# Patient Record
Sex: FEMALE | Race: BLACK OR AFRICAN AMERICAN | Employment: FULL TIME | URBAN - METROPOLITAN AREA
[De-identification: names, ages, dates, MRNs, and addresses within clinical notes are randomized per-mention and may not be internally consistent; named-entity substitution may affect disease eponyms.]

---

## 2021-10-26 ENCOUNTER — APPOINTMENT (OUTPATIENT)
Dept: GENERAL RADIOLOGY | Age: 41
End: 2021-10-26
Attending: PHYSICIAN ASSISTANT
Payer: OTHER GOVERNMENT

## 2021-10-26 ENCOUNTER — HOSPITAL ENCOUNTER (EMERGENCY)
Age: 41
Discharge: HOME | End: 2021-10-26
Attending: EMERGENCY MEDICINE
Payer: OTHER GOVERNMENT

## 2021-10-26 VITALS
OXYGEN SATURATION: 96 % | DIASTOLIC BLOOD PRESSURE: 65 MMHG | SYSTOLIC BLOOD PRESSURE: 101 MMHG | HEART RATE: 105 BPM | TEMPERATURE: 99 F | HEIGHT: 66 IN | RESPIRATION RATE: 20 BRPM | BODY MASS INDEX: 36.96 KG/M2 | WEIGHT: 230 LBS

## 2021-10-26 DIAGNOSIS — U07.1 COVID-19 VIRUS INFECTION: Primary | ICD-10-CM

## 2021-10-26 PROCEDURE — M0243 CASIRIVI AND IMDEVI INFUSION: HCPCS

## 2021-10-26 PROCEDURE — 71045 X-RAY EXAM CHEST 1 VIEW: CPT

## 2021-10-26 PROCEDURE — 99283 EMERGENCY DEPT VISIT LOW MDM: CPT

## 2021-10-26 PROCEDURE — 74011000258 HC RX REV CODE- 258: Performed by: EMERGENCY MEDICINE

## 2021-10-26 PROCEDURE — 74011000636 HC RX REV CODE- 636: Performed by: EMERGENCY MEDICINE

## 2021-10-26 RX ADMIN — CASIRIVIMAB AND IMDEVIMAB: 600; 600 INJECTION, SOLUTION, CONCENTRATE INTRAVENOUS at 14:57

## 2021-10-26 NOTE — ED PROVIDER NOTES
[address] [place] [address]     Gagan Macias is a 35 y.o. female seen on 10/26/2021 in the [address] [place], [place] [address]T in room 612 [facility]. Chief Complaint   Patient presents with    Positive For Covid-19     HPI: 25-year-old -American female presented to the emergency department with known COVID-19 infection. Patient arrived via EMS secondary to shortness of breath. Patient had a positive Covid test on 10/18/2021. Patient with symptoms 2 to 3 days prior to that. Patient states that she has been having shortness of breath and body aches. She is not vaccinated for COVID-19. She denies any nausea, vomiting, diarrhea. She has had increased fatigue, and intermittent fevers and body aches. She has no other complaints this time. Historian: Patient    REVIEW OF SYSTEMS     Review of Systems   Constitutional: Positive for fatigue and fever. Respiratory: Positive for choking and shortness of breath. Cardiovascular: Negative. Gastrointestinal: Negative. Genitourinary: Negative. Musculoskeletal: Positive for myalgias. Skin: Negative. Neurological: Negative. Psychiatric/Behavioral: Negative. All other systems reviewed and are negative. PAST MEDICAL HISTORY     No past medical history on file. No past surgical history on file. Social History     Socioeconomic History    Marital status: SINGLE     Spouse name: Not on file    Number of children: Not on file    Years of education: Not on file    Highest education level: Not on file     Social Determinants of Health     Financial Resource Strain:     Difficulty of Paying Living Expenses:    Food Insecurity:     Worried About Running Out of Food in the Last Year:     [address] in the Last Year:    Transportation Needs:     Lack of Transportation (Medical):      Lack of Transportation (Non-Medical):    Physical Activity:     Days of Exercise per Week:     Minutes of Exercise per Session:    Stress:     Feeling of Stress :    Social Connections:     Frequency of Communication with Friends and Family:     Frequency of Social Gatherings with Friends and Family:     Attends Congregation Services:     Active Member of Clubs or Organizations:     Attends Club or Organization Meetings:     Marital Status:      None     No Known Allergies     PHYSICAL EXAM       Vitals:    10/26/21 1347   BP: 121/75   Pulse: (!) 105   Resp: 20   Temp: 99 °F (37.2 °C)   SpO2: 95%    Vital signs were reviewed. Physical Exam  Vitals and nursing note reviewed. Constitutional:       Appearance: She is ill-appearing (Appears to not feel well). HENT:      Head: Normocephalic and atraumatic. Mouth/Throat:      Mouth: Mucous membranes are moist.   Eyes:      Extraocular Movements: Extraocular movements intact. Cardiovascular:      Rate and Rhythm: Normal rate and regular rhythm. Pulses: Normal pulses. Heart sounds: Normal heart sounds. Pulmonary:      Effort: Pulmonary effort is normal.      Breath sounds: Normal breath sounds. Abdominal:      Palpations: Abdomen is soft. Tenderness: There is no abdominal tenderness. Musculoskeletal:         General: Normal range of motion. Cervical back: Normal range of motion. Skin:     General: Skin is warm and dry. Neurological:      General: No focal deficit present. Mental Status: She is alert and oriented to person, place, and time. Psychiatric:         Mood and Affect: Mood normal.         Behavior: Behavior normal.         Thought Content:  Thought content normal.         Judgment: Judgment normal.          MEDICAL DECISION MAKING     ED Course:    Orders Placed This Encounter    XR CHEST PORT    AMBULATE PATIENT    DME Order for Pulse Oximeter Device As OP    DISCONTD: casirivimab-imdevimab (REGEN-COV) 1,200 mg in 0.9% sodium chloride 110 mL IVPB    casirivimab-imdevimab (REGEN-COV) 1,200 mg in 0.9% sodium chloride 110 mL IVPB     No results found for this or any previous visit (from the past 8 hour(s)). XR CHEST PORT    Result Date: 10/26/2021  PA CHEST ONE VIEW HISTORY:  ER:-COVID + on 10/18/21 with dry cough, sob and weakness COMPARISON: None FINDINGS: The heart size is normal. The lung volumes are diminished. There is minimal atelectasis or infiltrate in the left lung base. Pulmonary vascularity is within normal limits. Low lung volumes with minimal atelectasis or infiltrate in the left lung base. MDM  Number of Diagnoses or Management Options  Diagnosis management comments: 18-year-old female with [id number] presented emergency department with continued shortness of breath. Patient onset of symptoms is 10 to 11 days prior to arrival here. Patient has oxygen saturations are 95% or better. Patient will be given Regeneron monoclonal antibody infusion. Patient's chest x-ray was read as possible left lower lobe infiltrate however this appears to be her rib shadow and I believe this is an over read. Patient given pulse oximeter for home. She will return the emergency department for oxygen levels less than 90%. Amount and/or Complexity of Data Reviewed  Clinical lab tests: reviewed  Independent visualization of images, tracings, or specimens: yes    Patient Progress  Patient progress: stable        Disposition:  Discharged  Diagnosis:     ICD-10-CM ICD-9-CM   1. COVID-19 virus infection  U07.1 079.89     ____________________________________________________________________  A portion of this note was generated using voice recognition dictation software. While the note has been reviewed for accuracy, please note certain words and phrases may not be transcribed as intended and some grammatical and/or typographical errors may be present.

## 2021-10-26 NOTE — DISCHARGE INSTRUCTIONS
Return to the emergency department for oxygen levels less than 90% as discussed or for any other concerns.

## 2021-10-26 NOTE — ED NOTES
I have reviewed discharge instructions with the patient. The patient verbalized understanding. Patient left ED via Discharge Method: ambulatory to Home with self. Opportunity for questions and clarification provided. Patient given 0 scripts. To continue your aftercare when you leave the hospital, you may receive an automated call from our care team to check in on how you are doing. This is a free service and part of our promise to provide the best care and service to meet your aftercare needs.  If you have questions, or wish to unsubscribe from this service please call [phone]. Thank you for Choosing our [employer] Emergency Department.

## 2021-10-26 NOTE — ED NOTES
SOB and cough, positive Covid test on 10/18/21. Had symptoms 4 days prior to that. No wheezing, O2 95% on RA. Patient evaluated initially in triage. Rapid Medical Evaluation was conducted and necessary orders have been placed. I have performed a medical screening exam.  Care will now be transferred to the attending physician in the emergency department.   [facility] 2:65 PM

## 2023-06-08 ENCOUNTER — HOSPITAL ENCOUNTER (EMERGENCY)
Age: 43
Discharge: HOME | End: 2023-06-08
Attending: EMERGENCY MEDICINE
Payer: COMMERCIAL

## 2023-06-08 VITALS
SYSTOLIC BLOOD PRESSURE: 125 MMHG | DIASTOLIC BLOOD PRESSURE: 86 MMHG | TEMPERATURE: 97.9 F | HEART RATE: 90 BPM | BODY MASS INDEX: 37.12 KG/M2 | HEIGHT: 66 IN | RESPIRATION RATE: 16 BRPM | OXYGEN SATURATION: 97 %

## 2023-06-08 DIAGNOSIS — R74.8 ELEVATED LIVER ENZYMES: Primary | ICD-10-CM

## 2023-06-08 LAB
ALBUMIN SERPL-MCNC: 3.1 G/DL (ref 3.5–5)
ALBUMIN/GLOB SERPL: 0.6 (ref 0.4–1.6)
ALP SERPL-CCNC: 243 U/L (ref 50–130)
ALT SERPL-CCNC: 199 U/L (ref 12–65)
ANION GAP SERPL CALC-SCNC: 7 MMOL/L (ref 2–11)
AST SERPL-CCNC: 205 U/L (ref 15–37)
BASOPHILS # BLD: 0 K/UL (ref 0–0.2)
BASOPHILS NFR BLD: 1 % (ref 0–2)
BILIRUB SERPL-MCNC: 0.4 MG/DL (ref 0.2–1.1)
BUN SERPL-MCNC: 18 MG/DL (ref 6–23)
CALCIUM SERPL-MCNC: 8.8 MG/DL (ref 8.3–10.4)
CHLORIDE SERPL-SCNC: 111 MMOL/L (ref 101–110)
CO2 SERPL-SCNC: 28 MMOL/L (ref 21–32)
CREAT SERPL-MCNC: 0.98 MG/DL (ref 0.6–1)
DIFFERENTIAL METHOD BLD: NORMAL
EOSINOPHIL # BLD: 0.2 K/UL (ref 0–0.8)
EOSINOPHIL NFR BLD: 2 % (ref 0.5–7.8)
ERYTHROCYTE [DISTWIDTH] IN BLOOD BY AUTOMATED COUNT: 13.3 % (ref 11.9–14.6)
GLOBULIN SER CALC-MCNC: 5.2 G/DL (ref 2.8–4.5)
GLUCOSE SERPL-MCNC: 111 MG/DL (ref 65–100)
HCG SERPL QL: NEGATIVE
HCT VFR BLD AUTO: 41.2 % (ref 35.8–46.3)
HGB BLD-MCNC: 13.5 G/DL (ref 11.7–15.4)
IMM GRANULOCYTES # BLD AUTO: 0 K/UL (ref 0–0.5)
IMM GRANULOCYTES NFR BLD AUTO: 0 % (ref 0–5)
LYMPHOCYTES # BLD: 2.8 K/UL (ref 0.5–4.6)
LYMPHOCYTES NFR BLD: 41 % (ref 13–44)
MCH RBC QN AUTO: 28.5 PG (ref 26.1–32.9)
MCHC RBC AUTO-ENTMCNC: 32.8 G/DL (ref 31.4–35)
MCV RBC AUTO: 87.1 FL (ref 82–102)
MONOCYTES # BLD: 0.5 K/UL (ref 0.1–1.3)
MONOCYTES NFR BLD: 8 % (ref 4–12)
NEUTS SEG # BLD: 3.3 K/UL (ref 1.7–8.2)
NEUTS SEG NFR BLD: 48 % (ref 43–78)
NRBC # BLD: 0 K/UL (ref 0–0.2)
PLATELET # BLD AUTO: 258 K/UL (ref 150–450)
PMV BLD AUTO: 11.6 FL (ref 9.4–12.3)
POTASSIUM SERPL-SCNC: 4.1 MMOL/L (ref 3.5–5.1)
PROT SERPL-MCNC: 8.3 G/DL (ref 6.3–8.2)
RBC # BLD AUTO: 4.73 M/UL (ref 4.05–5.2)
SODIUM SERPL-SCNC: 146 MMOL/L (ref 133–143)
WBC # BLD AUTO: 6.8 K/UL (ref 4.3–11.1)

## 2023-06-08 PROCEDURE — 2580000003 HC RX 258: Performed by: EMERGENCY MEDICINE

## 2023-06-08 PROCEDURE — 6360000002 HC RX W HCPCS: Performed by: EMERGENCY MEDICINE

## 2023-06-08 PROCEDURE — 85025 COMPLETE CBC W/AUTO DIFF WBC: CPT

## 2023-06-08 PROCEDURE — 99284 EMERGENCY DEPT VISIT MOD MDM: CPT

## 2023-06-08 PROCEDURE — 96374 THER/PROPH/DIAG INJ IV PUSH: CPT

## 2023-06-08 PROCEDURE — 80053 COMPREHEN METABOLIC PANEL: CPT

## 2023-06-08 PROCEDURE — 84703 CHORIONIC GONADOTROPIN ASSAY: CPT

## 2023-06-08 PROCEDURE — 96361 HYDRATE IV INFUSION ADD-ON: CPT

## 2023-06-08 RX ORDER — SODIUM CHLORIDE, SODIUM LACTATE, POTASSIUM CHLORIDE, AND CALCIUM CHLORIDE .6; .31; .03; .02 G/100ML; G/100ML; G/100ML; G/100ML
1000 INJECTION, SOLUTION INTRAVENOUS ONCE
Status: COMPLETED | OUTPATIENT
Start: 2023-06-08 | End: 2023-06-08

## 2023-06-08 RX ORDER — KETOROLAC TROMETHAMINE 15 MG/ML
15 INJECTION, SOLUTION INTRAMUSCULAR; INTRAVENOUS ONCE
Status: COMPLETED | OUTPATIENT
Start: 2023-06-08 | End: 2023-06-08

## 2023-06-08 RX ADMIN — KETOROLAC TROMETHAMINE 15 MG: 15 INJECTION, SOLUTION INTRAMUSCULAR; INTRAVENOUS at 07:46

## 2023-06-08 RX ADMIN — SODIUM CHLORIDE, POTASSIUM CHLORIDE, SODIUM LACTATE AND CALCIUM CHLORIDE 1000 ML: 600; 310; 30; 20 INJECTION, SOLUTION INTRAVENOUS at 07:40

## 2023-06-08 ASSESSMENT — ENCOUNTER SYMPTOMS
VOMITING: 0
NAUSEA: 0
EYE PAIN: 0
COUGH: 0
BACK PAIN: 0
VOICE CHANGE: 0
TROUBLE SWALLOWING: 0
ABDOMINAL PAIN: 0
SHORTNESS OF BREATH: 0
FACIAL SWELLING: 0
SORE THROAT: 0
CHEST TIGHTNESS: 0

## 2023-06-08 ASSESSMENT — PAIN SCALES - GENERAL
PAINLEVEL_OUTOF10: 6
PAINLEVEL_OUTOF10: 6

## 2023-06-08 ASSESSMENT — LIFESTYLE VARIABLES
HOW MANY STANDARD DRINKS CONTAINING ALCOHOL DO YOU HAVE ON A TYPICAL DAY: PATIENT DOES NOT DRINK
HOW OFTEN DO YOU HAVE A DRINK CONTAINING ALCOHOL: NEVER

## 2023-06-08 ASSESSMENT — PAIN DESCRIPTION - LOCATION: LOCATION: HEAD

## 2023-06-08 ASSESSMENT — PAIN DESCRIPTION - DESCRIPTORS: DESCRIPTORS: THROBBING

## 2023-06-08 ASSESSMENT — PAIN - FUNCTIONAL ASSESSMENT: PAIN_FUNCTIONAL_ASSESSMENT: 0-10

## 2023-06-08 NOTE — ED PROVIDER NOTES
Emergency Department Provider Note       PCP: No primary care provider on file. Age: 37 y.o. Sex: female     DISPOSITION Decision To Discharge 06/08/2023 09:18:35 AM       ICD-10-CM    1. Elevated liver enzymes  R74.8           Medical Decision Making     47-year-old female presents to the emergency department via private vehicle with chief complaint of headache blurry vision and polydipsia. Patient reports having a gradual onset of headache over the past 4 days she has some associated bilateral blurry vision she denies any motor or sensation deficits she reports no prior medical history is not any current medicines she is not followed by PCP she denies any chest pain shortness of breath abdominal pain or urinary symptoms. She is concerned for potential diabetes with no prior history of diabetes vital signs are reviewed. Patient was given fluids and Toradol for the headache. Basic lab work here is obtained to check the patient's sugars. Vital signs are reviewed CBC is unremarkable. CMP showed elevated liver enzymes with normal bilirubin. hCG is negative. Patient states that she has been told in the past she has had elevated liver enzymes she is not complaining of any abdominal pain. This point patient was given follow-up with her primary care doctor for continued evaluation and work-up of her elevated liver enzymes I stressed importance of this and she was agreeable with this. Patient has no emergent findings at this time. Patient's heart rate has normalized into the 90s with the fluids and her headache is much better. Patient stable discharge abdominal examination        Complexity of Problems Addressed:  1 or more acute illnesses that pose a threat to life or bodily function. Data Reviewed and Analyzed:  Category 1:   I independently ordered and reviewed each unique test.  I reviewed external records: ED visit note from an outside group.   I reviewed external records: provider visit note from

## 2023-06-08 NOTE — DISCHARGE INSTRUCTIONS
Please make sure you call this number to establish a primary care doctor so they can continue to evaluate your elevated liver enzymes. We would love to help you get a primary care doctor for follow-up after your emergency department visit. Please call [phone] between 7AM - 6PM Monday to Friday. A care navigator will be able to assist you with setting up a doctor close to your home.

## 2023-06-08 NOTE — ED NOTES
I have reviewed discharge instructions with the patient. The patient verbalized understanding. Patient left ED via Discharge Method: ambulatory to Home with self. Opportunity for questions and clarification provided. Patient given 0 scripts. To continue your aftercare when you leave the hospital, you may receive an automated call from our care team to check in on how you are doing. This is a free service and part of our promise to provide the best care and service to meet your aftercare needs.  If you have questions, or wish to unsubscribe from this service please call [phone]. Thank you for Choosing our [facility] Emergency Department.         [facility]  06/08/23 2825

## 2024-05-09 ENCOUNTER — HOSPITAL ENCOUNTER (EMERGENCY)
Age: 44
Discharge: HOME | End: 2024-05-09
Attending: EMERGENCY MEDICINE
Payer: COMMERCIAL

## 2024-05-09 VITALS
RESPIRATION RATE: 16 BRPM | WEIGHT: 240 LBS | DIASTOLIC BLOOD PRESSURE: 107 MMHG | SYSTOLIC BLOOD PRESSURE: 155 MMHG | HEIGHT: 66 IN | HEART RATE: 99 BPM | TEMPERATURE: 98 F | OXYGEN SATURATION: 96 % | BODY MASS INDEX: 38.57 KG/M2

## 2024-05-09 DIAGNOSIS — Z77.21 EXPOSURE TO BODY FLUID DUE TO ACCIDENTAL HYPODERMIC NEEDLESTICK INJURY: Primary | ICD-10-CM
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
## 2024-05-09 LAB
HAV IGM SER QL: NONREACTIVE
HBV CORE IGM SER QL: NONREACTIVE
HBV SURFACE AG SER QL: NONREACTIVE
HCV AB SER QL: NONREACTIVE
HIV 1+2 AB+HIV1 P24 AG SERPL QL IA: NONREACTIVE
HIV 1/2 RESULT COMMENT: NORMAL

## 2024-05-09 PROCEDURE — 80074 ACUTE HEPATITIS PANEL: CPT

## 2024-05-09 PROCEDURE — 99282 EMERGENCY DEPT VISIT SF MDM: CPT

## 2024-05-09 PROCEDURE — 87389 HIV-1 AG W/HIV-1&-2 AB AG IA: CPT

## 2024-05-09 ASSESSMENT — PAIN - FUNCTIONAL ASSESSMENT: PAIN_FUNCTIONAL_ASSESSMENT: NONE - DENIES PAIN

## 2024-05-09 NOTE — DISCHARGE INSTRUCTIONS
We recommend testing for hepatitis C and HIV in roughly 6 weeks to 3 months, this can be done through primary care provider or potentially through occupational health with work.  Please speak with your supervisor further regarding possible testing options through work.  You may also call the health department and inquire whether or not HIV or hepatitis can be performed.  Your results will come to your MyChart and ideally someone should call you if your test results from today's testing is positive.  We recommend follow-up with your primary care provider in the next 2-4 weeks

## 2024-05-09 NOTE — ED PROVIDER NOTES
Emergency Department Provider Note                   PCP:                No primary care provider on file.               Age: 44 y.o.      Sex: female   Final diagnosis/impression:  1. Exposure to body fluid due to accidental hypodermic needlestick injury       Disposition: Discharged    MDM/Clinical Course:  Patient seen by myself at the [facility] emergency department. Patient had signs symptoms and clinical history most consistent with needlestick exposure with insulin needle.  Well-appearing.  HIV and hepatitis panel to be performed recommended basic wound care measures for needlestick patient be notified of testing is positive, also instructed patient that results should be available in [employer].  Did recommend retesting sometime between 6 weeks for hepatitis and around 3 months for HIV given limitations of testing related to needlestick exposure this morning.  Recommended monitoring for symptoms, return as needed.  Patient/family given instructions to return as needed for any questions, concerns or worsening symptoms, particularly those as outlined in the disposition section / discharge section of patient discharge paperwork. Patient/family verbalizes understanding and agreement with ED course/plan in shared medical decision making. Questions answered.      Complexity of Problems Addressed:  1 or more acute illnesses that pose a threat to life or bodily function.     Data Reviewed and Analyzed:    Category 1:   I ordered each unique test.  I reviewed the results of each unique test.    Category 2:     Category 3: Discussion of management or test interpretation.  See MDM / clinical course section above for details    Risk of Complications and/or Morbidity of Patient Management:  Shared medical decision making was utilized in creating the patients health plan today.    Orders/Meds:    Orders Placed This Encounter   Procedures    Hepatitis Panel, Acute    HIV 1/2 Ag/Ab, 4TH Generation,W Rflx Confirm

## 2024-07-17 ENCOUNTER — HOSPITAL ENCOUNTER (EMERGENCY)
Age: 44
Discharge: HOME | End: 2024-07-17
Attending: EMERGENCY MEDICINE
Payer: COMMERCIAL

## 2024-07-17 VITALS
HEART RATE: 81 BPM | WEIGHT: 240 LBS | TEMPERATURE: 98.2 F | RESPIRATION RATE: 16 BRPM | BODY MASS INDEX: 38.57 KG/M2 | DIASTOLIC BLOOD PRESSURE: 83 MMHG | HEIGHT: 66 IN | SYSTOLIC BLOOD PRESSURE: 155 MMHG | OXYGEN SATURATION: 97 %

## 2024-07-17 DIAGNOSIS — M54.32 SCIATICA OF LEFT SIDE: Primary | ICD-10-CM

## 2024-07-17 PROCEDURE — 99284 EMERGENCY DEPT VISIT MOD MDM: CPT

## 2024-07-17 PROCEDURE — 6370000000 HC RX 637 (ALT 250 FOR IP): Performed by: EMERGENCY MEDICINE

## 2024-07-17 PROCEDURE — 96372 THER/PROPH/DIAG INJ SC/IM: CPT

## 2024-07-17 PROCEDURE — 6360000002 HC RX W HCPCS: Performed by: EMERGENCY MEDICINE

## 2024-07-17 RX ORDER — HYDROCODONE BITARTRATE AND ACETAMINOPHEN 5; 325 MG/1; MG/1
1 TABLET ORAL
Status: COMPLETED | OUTPATIENT
Start: 2024-07-17 | End: 2024-07-17

## 2024-07-17 RX ORDER — DEXAMETHASONE SODIUM PHOSPHATE 10 MG/ML
10 INJECTION INTRAMUSCULAR; INTRAVENOUS ONCE
Status: COMPLETED | OUTPATIENT
Start: 2024-07-17 | End: 2024-07-17

## 2024-07-17 RX ORDER — CYCLOBENZAPRINE HCL 10 MG
10 TABLET ORAL 3 TIMES DAILY PRN
Qty: 21 TABLET | Refills: 0 | Status: SHIPPED | OUTPATIENT
Start: 2024-07-17 | End: 2024-07-27

## 2024-07-17 RX ORDER — LIDOCAINE 50 MG/G
1 PATCH TOPICAL DAILY
Qty: 10 PATCH | Refills: 0 | Status: SHIPPED | OUTPATIENT
Start: 2024-07-17 | End: 2024-07-27

## 2024-07-17 RX ORDER — CYCLOBENZAPRINE HCL 10 MG
10 TABLET ORAL
Status: COMPLETED | OUTPATIENT
Start: 2024-07-17 | End: 2024-07-17

## 2024-07-17 RX ORDER — LIDOCAINE 4 G/G
1 PATCH TOPICAL
Status: DISCONTINUED | OUTPATIENT
Start: 2024-07-17 | End: 2024-07-17 | Stop reason: HOSPADM

## 2024-07-17 RX ORDER — NAPROXEN 500 MG/1
500 TABLET ORAL 2 TIMES DAILY PRN
Qty: 60 TABLET | Refills: 0 | Status: SHIPPED | OUTPATIENT
Start: 2024-07-17

## 2024-07-17 RX ORDER — KETOROLAC TROMETHAMINE 30 MG/ML
30 INJECTION, SOLUTION INTRAMUSCULAR; INTRAVENOUS ONCE
Status: COMPLETED | OUTPATIENT
Start: 2024-07-17 | End: 2024-07-17

## 2024-07-17 RX ADMIN — KETOROLAC TROMETHAMINE 30 MG: 30 INJECTION, SOLUTION INTRAMUSCULAR at 05:25

## 2024-07-17 RX ADMIN — DEXAMETHASONE SODIUM PHOSPHATE 10 MG: 10 INJECTION INTRAMUSCULAR; INTRAVENOUS at 05:25

## 2024-07-17 RX ADMIN — HYDROCODONE BITARTRATE AND ACETAMINOPHEN 1 TABLET: 5; 325 TABLET ORAL at 05:25

## 2024-07-17 RX ADMIN — CYCLOBENZAPRINE 10 MG: 10 TABLET, FILM COATED ORAL at 05:25

## 2024-07-17 ASSESSMENT — PAIN SCALES - GENERAL: PAINLEVEL_OUTOF10: 8

## 2024-07-17 NOTE — ED TRIAGE NOTES
Pt presents to Ed with c/o pain in her left buttock that travels down the leg. Pain has been present x3 days.

## 2024-07-17 NOTE — DISCHARGE INSTRUCTIONS
If you have difficulty controlling your bowel or bladder, develop leg weakness, or if you have any other concerning symptoms, please return to the ER immediately.

## 2024-07-17 NOTE — ED PROVIDER NOTES
Emergency Department Provider Note       PCP: No primary care provider on file.   Age: 44 y.o.   Sex: female     DISPOSITION Decision To Discharge 07/17/2024 05:22:02 AM       ICD-10-CM    1. Sciatica of left side  M54.32           Medical Decision Making     Patient comes to the ED for evaluation of left-sided leg pain beginning from the buttock and radiating down her left lateral thigh, ongoing for the past 4 days.  Patient denies any injury.  She denies any saddle paresthesias, urinary retention or incontinence.  Patient is ambulatory in the ED without difficulty.  She does not have midline lumbar TTP.  She has left buttock TTP, extending to left proximal thigh.  Will treat for sciatica.  Patient given Decadron and Toradol IM while in the ED.  Patient also given dose of Flexeril and Norco.  Lidoderm patch applied.  She is stable for DC home.  Prescriptions for naproxen, Flexeril and Lidoderm patches sent to her pharmacy.  Strict return precautions discussed.     1 acute, uncomplicated illness or injury.    Over the counter drug management performed.  Prescription drug management performed.  Shared medical decision making was utilized in creating the patients health plan today.    I independently ordered and reviewed each unique test.    I reviewed external records: pt seen in ED on 5/9/24            History     Patient comes to the ED for evaluation of left-sided leg pain beginning from the buttock and radiating down her left lateral thigh, ongoing for the past 4 days.    The history is provided by the patient and medical records. No  was used.     Physical Exam     Vitals signs and nursing note reviewed:  Vitals:    07/17/24 0501   BP: (!) 155/83   Pulse: 81   Resp: 16   Temp: 98.2 °F (36.8 °C)   TempSrc: Oral   SpO2: 97%   Weight: 108.9 kg (240 lb)   Height: 1.676 m (5' 6\")      Physical Exam  Vitals and nursing note reviewed.   Constitutional:       General: She is not in acute

## 2024-07-28 ENCOUNTER — APPOINTMENT (OUTPATIENT)
Dept: GENERAL RADIOLOGY | Age: 44
End: 2024-07-28
Payer: COMMERCIAL

## 2024-07-28 ENCOUNTER — HOSPITAL ENCOUNTER (EMERGENCY)
Age: 44
Discharge: HOME | End: 2024-07-28
Attending: EMERGENCY MEDICINE
Payer: COMMERCIAL

## 2024-07-28 ENCOUNTER — APPOINTMENT (OUTPATIENT)
Dept: ULTRASOUND IMAGING | Age: 44
End: 2024-07-28
Payer: COMMERCIAL

## 2024-07-28 VITALS
HEART RATE: 95 BPM | RESPIRATION RATE: 18 BRPM | DIASTOLIC BLOOD PRESSURE: 82 MMHG | OXYGEN SATURATION: 96 % | HEIGHT: 66 IN | WEIGHT: 245 LBS | TEMPERATURE: 97.8 F | SYSTOLIC BLOOD PRESSURE: 126 MMHG | BODY MASS INDEX: 39.37 KG/M2

## 2024-07-28 DIAGNOSIS — M54.32 SCIATICA, LEFT SIDE: Primary | ICD-10-CM

## 2024-07-28 PROCEDURE — 93971 EXTREMITY STUDY: CPT

## 2024-07-28 PROCEDURE — 72100 X-RAY EXAM L-S SPINE 2/3 VWS: CPT

## 2024-07-28 PROCEDURE — 99284 EMERGENCY DEPT VISIT MOD MDM: CPT

## 2024-07-28 RX ORDER — METHYLPREDNISOLONE 4 MG/1
TABLET ORAL
Qty: 1 KIT | Refills: 0 | Status: SHIPPED | OUTPATIENT
Start: 2024-07-28 | End: 2024-08-03

## 2024-07-28 RX ORDER — IBUPROFEN 600 MG/1
600 TABLET ORAL 3 TIMES DAILY PRN
Qty: 30 TABLET | Refills: 0 | Status: SHIPPED | OUTPATIENT
Start: 2024-07-28 | End: 2024-08-07

## 2024-07-28 RX ORDER — TRAMADOL HYDROCHLORIDE 50 MG/1
50 TABLET ORAL EVERY 6 HOURS PRN
Qty: 12 TABLET | Refills: 0 | Status: SHIPPED | OUTPATIENT
Start: 2024-07-28 | End: 2024-07-31

## 2024-07-28 ASSESSMENT — PAIN - FUNCTIONAL ASSESSMENT: PAIN_FUNCTIONAL_ASSESSMENT: 0-10

## 2024-07-28 ASSESSMENT — PAIN SCALES - GENERAL: PAINLEVEL_OUTOF10: 8

## 2024-07-28 ASSESSMENT — PAIN DESCRIPTION - LOCATION: LOCATION: LEG

## 2024-07-28 NOTE — ED NOTES
I have reviewed discharge instructions with the patient.  The patient verbalized understanding.    Patient left ED via Discharge Method: ambulatory to Home with self.    Opportunity for questions and clarification provided.       Patient given 3 scripts.         To continue your aftercare when you leave the hospital, you may receive an automated call from our care team to check in on how you are doing.  This is a free service and part of our promise to provide the best care and service to meet your aftercare needs.” If you have questions, or wish to unsubscribe from this service please call [phone].  Thank you for Choosing our [facility] Emergency Department.

## 2024-07-28 NOTE — ED PROVIDER NOTES
posterior calf.  No obvious swelling but difficult to assess due to patient's body habitus.   Skin:     General: Skin is warm and dry.   Neurological:      General: No focal deficit present.      Mental Status: She is alert and oriented to person, place, and time.   Psychiatric:         Mood and Affect: Mood normal.          Procedures    Results for orders placed or performed during the hospital encounter of 07/28/24   XR LUMBAR SPINE (2-3 VIEWS)    Narrative    EXAM: XR LUMBAR SPINE (2-3 VIEWS)  INDICATION: left leg pain  COMPARISON: None.    TECHNIQUE: Lumbar Spine AP/LAT imaging was obtained.    Findings: Disproportionate moderate changes of degenerative disc disease at the  L5-S1 level. Moderate multilevel facet degenerative changes of the lumbar spine.  Scattered endplate degenerative changes elsewhere. No acute fracture or osseous  lesion. Soft tissues are without acute finding. Mild degenerative changes of the  incompletely evaluated sacroiliac joints and hips.      Impression    Moderate degenerative changes without acute finding.    Electronically signed by Andrew Kapoor   Vascular duplex lower extremity venous left    Narrative    Left lower extremity venous ultrasound    INDICATION:  Pain and swelling,    COMPARISON:  None    Doppler ultrasound of the left lower extremity was performed.    FINDINGS:  There is normal flow in the great saphenous, common femoral, femoral,  and popliteal veins. Normal compression and augmentation is demonstrated. The  proximal calf veins are also patent.      Impression    No evidence of deep venous thrombosis in the left lower extremity      Electronically signed by Moe Lezama        Vascular duplex lower extremity venous left   Final Result   No evidence of deep venous thrombosis in the left lower extremity         Electronically signed by Moe Lezama      XR LUMBAR SPINE (2-3 VIEWS)   Final Result   Moderate degenerative changes without acute finding.

## 2024-07-28 NOTE — DISCHARGE INSTRUCTIONS
Take medication as prescribed.    Follow up with primary care.     We would love to help you get a primary care doctor for follow-up after your emergency department visit.    Please call [phone] between 7AM - 6PM Monday to Friday.  A care navigator will be able to assist you with setting up a doctor close to your home.

## 2024-07-28 NOTE — ED TRIAGE NOTES
Pt reports left leg pain x 2 weeks getting progressively worse in whole leg. No known trauma/injury. No swelling. Pt ambulatory in triage with limp.

## 2025-01-30 ENCOUNTER — APPOINTMENT (OUTPATIENT)
Dept: GENERAL RADIOLOGY | Age: 45
End: 2025-01-30
Payer: COMMERCIAL

## 2025-01-30 ENCOUNTER — HOSPITAL ENCOUNTER (EMERGENCY)
Age: 45
Discharge: HOME | End: 2025-01-30
Attending: EMERGENCY MEDICINE
Payer: COMMERCIAL

## 2025-01-30 VITALS
HEIGHT: 66 IN | OXYGEN SATURATION: 98 % | SYSTOLIC BLOOD PRESSURE: 137 MMHG | BODY MASS INDEX: 36.96 KG/M2 | RESPIRATION RATE: 16 BRPM | HEART RATE: 100 BPM | TEMPERATURE: 98.3 F | WEIGHT: 230 LBS | DIASTOLIC BLOOD PRESSURE: 86 MMHG

## 2025-01-30 DIAGNOSIS — S39.012A STRAIN OF LUMBAR REGION, INITIAL ENCOUNTER: Primary | ICD-10-CM

## 2025-01-30 PROCEDURE — 6370000000 HC RX 637 (ALT 250 FOR IP): Performed by: EMERGENCY MEDICINE

## 2025-01-30 PROCEDURE — 99283 EMERGENCY DEPT VISIT LOW MDM: CPT

## 2025-01-30 PROCEDURE — 72100 X-RAY EXAM L-S SPINE 2/3 VWS: CPT

## 2025-01-30 RX ORDER — NAPROXEN 250 MG/1
500 TABLET ORAL
Status: COMPLETED | OUTPATIENT
Start: 2025-01-30 | End: 2025-01-30

## 2025-01-30 RX ORDER — METHOCARBAMOL 750 MG/1
750 TABLET, FILM COATED ORAL
Status: COMPLETED | OUTPATIENT
Start: 2025-01-30 | End: 2025-01-30

## 2025-01-30 RX ADMIN — NAPROXEN 500 MG: 250 TABLET ORAL at 15:28

## 2025-01-30 RX ADMIN — METHOCARBAMOL 750 MG: 750 TABLET ORAL at 15:28

## 2025-01-30 ASSESSMENT — PAIN DESCRIPTION - ORIENTATION
ORIENTATION: LOWER
ORIENTATION: LOWER

## 2025-01-30 ASSESSMENT — PAIN - FUNCTIONAL ASSESSMENT: PAIN_FUNCTIONAL_ASSESSMENT: 0-10

## 2025-01-30 ASSESSMENT — PAIN SCALES - GENERAL
PAINLEVEL_OUTOF10: 7
PAINLEVEL_OUTOF10: 7

## 2025-01-30 ASSESSMENT — PAIN DESCRIPTION - LOCATION
LOCATION: BACK
LOCATION: BACK

## 2025-01-30 NOTE — DISCHARGE INSTR - COC
Continuity of Care Form    Patient Name: Sami Guy   :  1980  MRN:  [id number]    Admit date:  2025  Discharge date:  ***    Code Status Order: No Order   Advance Directives:   Advance Care Flowsheet Documentation             Admitting Physician:  No admitting provider for patient encounter.  PCP: No primary care provider on file.    Discharging Nurse: ***  Discharging Hospital Unit/Room#: RPA3/RP3  Discharging Unit Phone Number: ***    Emergency Contact:   Extended Emergency Contact Information  Primary Emergency Contact: Lukasz Pack  Mobile Phone: [phone]  Relation: Other    Past Surgical History:  No past surgical history on file.    Immunization History:     There is no immunization history on file for this patient.    Active Problems:  Patient Active Problem List   Diagnosis Code    Elevated liver enzymes R74.8       Isolation/Infection:   Isolation            No Isolation          Patient Infection Status       None to display            Nurse Assessment:  Last Vital Signs: /86   Pulse 100   Temp 98.3 °F (36.8 °C) (Oral)   Resp 16   Ht 1.676 m (5' 6\")   Wt 104.3 kg (230 lb)   SpO2 98%   BMI 37.12 kg/m²     Last documented pain score (0-10 scale): Pain Level: 7  Last Weight:   Wt Readings from Last 1 Encounters:   25 104.3 kg (230 lb)     Mental Status:  {IP PT MENTAL STATUS:}    IV Access:  { RICO IV ACCESS:296208779}    Nursing Mobility/ADLs:  Walking   {CHP DME ADLs:956375131}  Transfer  {CHP DME ADLs:249933613}  Bathing  {CHP DME ADLs:885226174}  Dressing  {CHP DME ADLs:916775365}  Toileting  {CHP DME ADLs:661069517}  Feeding  {CHP DME ADLs:094518630}  Med Admin  {CHP DME ADLs:465504483}  Med Delivery   { RICO MED Delivery:609382404}    Wound Care Documentation and Therapy:        Elimination:  Continence:   Bowel: {YES / NO:}  Bladder: {YES / NO:}  Urinary Catheter: {Urinary Catheter:998113831}   Colostomy/Ileostomy/Ileal Conduit: {YES / NO:}        Date of Last BM: ***  No intake or output data in the 24 hours ending 25 1609  No intake/output data recorded.    Safety Concerns:     { RICO Safety Concerns:198147111}    Impairments/Disabilities:      { RICO Impairments/Disabilities:191275925}    Nutrition Therapy:  Current Nutrition Therapy:   { RICO Diet List:196276308}    Routes of Feeding: {CHP DME Other Feedings:159963200}  Liquids: {Slp liquid thickness:86675}  Daily Fluid Restriction: {[facility] DME Yes amt example:446626158}  Last Modified Barium Swallow with Video (Video Swallowing Test): {Done Not Done Date:}    Treatments at the Time of Hospital Discharge:   Respiratory Treatments: ***  Oxygen Therapy:  {Therapy; copd oxygen:64609}  Ventilator:    { CC Vent List:563158385}    Rehab Therapies: {THERAPEUTIC INTERVENTION:7071340364}  Weight Bearing Status/Restrictions: { CC Weight Bearin}  Other Medical Equipment (for information only, NOT a DME order):  {EQUIPMENT:917482028}  Other Treatments: ***    Patient's personal belongings (please select all that are sent with patient):  {[facility] DME Belongings:879493395}    RN SIGNATURE:  {Esignature:750093666}    CASE MANAGEMENT/SOCIAL WORK SECTION    Inpatient Status Date: ***    Readmission Risk Assessment Score:  [facility] RISK OF UNPLANNED READMISSION 2.0             0 Total Score        Discharging to Facility/ Agency   Name:   Address:  Phone:  Fax:    Dialysis Facility (if applicable)   Name:  Address:  Dialysis Schedule:  Phone:  Fax:    / signature: {Esignature:071147966}    PHYSICIAN SECTION    Prognosis: {Prognosis:8448959760}    Condition at Discharge: { Patient Condition:587361505}    Rehab Potential (if transferring to Rehab): {Prognosis:5668534263}    Recommended Labs or Other Treatments After Discharge: ***    Physician Certification: I certify the above information and transfer of Sami Guy  is necessary for the continuing treatment of the

## 2025-01-30 NOTE — ED TRIAGE NOTES
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 Patient has not taken anything OTC since incident occurred around 0700 this morning.

## 2025-01-30 NOTE — DISCHARGE INSTRUCTIONS
Follow-up with Workmen's Comp.  Aleve or ibuprofen for discomfort (Aleve 2-3 times daily with food OR ibuprofen 400 mg every 6-8 hours with food)  X-ray shows chronic changes but no acute on plain imaging

## 2025-01-30 NOTE — ED PROVIDER NOTES
Emergency Department Provider Note       PCP: No primary care provider on file.   Age: 45 y.o.   Sex: female     DISPOSITION                No diagnosis found.    Medical Decision Making     ***     {Complexity:14535}  {Risk:70714}    I independently ordered and reviewed each unique test.  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  She was helping to lift the patient at that time.  Denies any lower extremity weakness or numbness.  No history of her back surgery.  After her ER visit patient has been instructed to go back and check in with her supervisor.    The history is provided by the patient.       ROS     Review of Systems     Physical Exam     Vitals signs and nursing note reviewed:  Vitals:    25 1159   BP: 137/86   Pulse: 100   Resp: 16   Temp: 98.3 °F (36.8 °C)   TempSrc: Oral   SpO2: 98%   Weight: 104.3 kg (230 lb)   Height: 1.676 m (5' 6\")      Physical Exam   Procedures     Procedures    Orders Placed This Encounter   Procedures    XR LUMBAR SPINE (2-3 VIEWS)        Medications given during this emergency department visit:  Medications   naproxen (NAPROSYN) tablet 500 mg (500 mg Oral Given 25 1528)   methocarbamol (ROBAXIN) tablet 750 mg (750 mg Oral Given 25 1528)       New Prescriptions    No medications on file        No past medical history on file.     No past surgical history on file.     Social History     Socioeconomic History    Marital status: Single     Social Determinants of Health     Physical Activity: Unknown (7/10/2023)    Exercise Vital Sign     Minutes of Exercise per Session: 0 min    Palpations: Abdomen is soft.   Musculoskeletal:      Cervical back: Normal and normal range of motion.      Thoracic back: Normal.      Lumbar back: Tenderness present. No swelling, deformity, spasms or bony tenderness. Negative right straight leg raise test and negative left straight leg raise test.      Comments: Mainly paraspinous discomfort with no appreciable spasm to the lumbar region but maintains quite normal body mechanics in general   Neurological:      Mental Status: She is alert.        Procedures     Procedures    Orders Placed This Encounter   Procedures    XR LUMBAR SPINE (2-3 VIEWS)        Medications given during this emergency department visit:  Medications   naproxen (NAPROSYN) tablet 500 mg (500 mg Oral Given 1/30/25 1528)   methocarbamol (ROBAXIN) tablet 750 mg (750 mg Oral Given 1/30/25 1528)       New Prescriptions    No medications on file        No past medical history on file.     No past surgical history on file.     Social History     Socioeconomic History    Marital status: Single     Social Determinants of Health     Physical Activity: Unknown (7/10/2023)    Exercise Vital Sign     Minutes of Exercise per Session: 0 min   Intimate Partner Violence: Not At Risk (7/10/2023)    Humiliation, Afraid, Rape, and Kick questionnaire     Fear of Current or Ex-Partner: No     Emotionally Abused: No     Physically Abused: No     Sexually Abused: No        Previous Medications    IBUPROFEN (ADVIL;MOTRIN) 600 MG TABLET    Take 1 tablet by mouth 3 times daily as needed for Pain        Results for orders placed or performed during the hospital encounter of 01/30/25   XR LUMBAR SPINE (2-3 VIEWS)    Narrative    XR LUMBAR SPINE (2-3 VIEWS)    Indication: 45 years Female Lower lumbar pain after lifting      Comparison: Lumbar spine x-ray 7/28/2024.    Technique: 3 views of the lumbar spine were obtained.    FINDINGS:   5 nonrib-bearing vertebral bodies are present with the inferior most complete  disc

## 2025-02-04 ASSESSMENT — ENCOUNTER SYMPTOMS: BACK PAIN: 1

## 2025-08-21 ENCOUNTER — HOSPITAL ENCOUNTER (EMERGENCY)
Age: 45
Discharge: HOME | End: 2025-08-21
Payer: COMMERCIAL

## 2025-08-21 VITALS
OXYGEN SATURATION: 95 % | HEIGHT: 66 IN | TEMPERATURE: 98.4 F | SYSTOLIC BLOOD PRESSURE: 127 MMHG | WEIGHT: 293 LBS | BODY MASS INDEX: 47.09 KG/M2 | RESPIRATION RATE: 18 BRPM | HEART RATE: 93 BPM | DIASTOLIC BLOOD PRESSURE: 82 MMHG

## 2025-08-21 DIAGNOSIS — U07.1 COVID-19: Primary | ICD-10-CM

## 2025-08-21 LAB
FLUAV RNA SPEC QL NAA+PROBE: NOT DETECTED
FLUBV RNA SPEC QL NAA+PROBE: NOT DETECTED
SARS-COV-2 RDRP RESP QL NAA+PROBE: DETECTED
SOURCE: ABNORMAL

## 2025-08-21 PROCEDURE — 99283 EMERGENCY DEPT VISIT LOW MDM: CPT

## 2025-08-21 PROCEDURE — 87636 SARSCOV2 & INF A&B AMP PRB: CPT

## 2025-08-21 ASSESSMENT — PAIN SCALES - GENERAL
PAINLEVEL_OUTOF10: 0
PAINLEVEL_OUTOF10: 0

## 2025-08-21 ASSESSMENT — PAIN - FUNCTIONAL ASSESSMENT
PAIN_FUNCTIONAL_ASSESSMENT: 0-10
PAIN_FUNCTIONAL_ASSESSMENT: 0-10